# Patient Record
Sex: FEMALE | Race: WHITE | URBAN - NONMETROPOLITAN AREA
[De-identification: names, ages, dates, MRNs, and addresses within clinical notes are randomized per-mention and may not be internally consistent; named-entity substitution may affect disease eponyms.]

---

## 2019-03-06 ENCOUNTER — IMPORTED ENCOUNTER (OUTPATIENT)
Dept: URBAN - NONMETROPOLITAN AREA CLINIC 1 | Facility: CLINIC | Age: 49
End: 2019-03-06

## 2019-03-06 PROBLEM — H52.03: Noted: 2019-03-06

## 2019-03-06 PROBLEM — H52.223: Noted: 2019-03-06

## 2019-03-06 PROCEDURE — S0620 ROUTINE OPHTHALMOLOGICAL EXA: HCPCS

## 2019-03-06 PROCEDURE — 92310 CONTACT LENS FITTING OU: CPT

## 2019-03-06 PROCEDURE — V2520 CONTACT LENS HYDROPHILIC: HCPCS

## 2019-03-06 NOTE — PATIENT DISCUSSION
Hyperopia-Discussed diagnosis with patient. Astigmatism-Discussed diagnosis with patient. Presbyopia-Discussed diagnosis with patient. Updated spec Rx given. Recommend lens that will provide comfort as well as protect safety and health of eyes. CL wear-CLs fit and center well.-Stressed that patient should not sleep in CL. -Updated CL Rx given. -CL care and precautions given. MONO OD DIST OS NEAR

## 2020-03-10 ENCOUNTER — IMPORTED ENCOUNTER (OUTPATIENT)
Dept: URBAN - NONMETROPOLITAN AREA CLINIC 1 | Facility: CLINIC | Age: 50
End: 2020-03-10

## 2020-03-10 PROCEDURE — 92310 CONTACT LENS FITTING OU: CPT

## 2020-03-10 PROCEDURE — S0621 ROUTINE OPHTHALMOLOGICAL EXA: HCPCS

## 2020-03-10 PROCEDURE — V2520 CONTACT LENS HYDROPHILIC: HCPCS

## 2020-10-16 ENCOUNTER — IMPORTED ENCOUNTER (OUTPATIENT)
Dept: URBAN - NONMETROPOLITAN AREA CLINIC 1 | Facility: CLINIC | Age: 50
End: 2020-10-16

## 2022-04-15 ASSESSMENT — VISUAL ACUITY
OD_SC: 20/50+3
OD_SC: 20/20
OS_SC: 20/30+4
OD_SC: 20/40-1
OD_SC: 20/40-1
OS_SC: 20/25-
OS_SC: 20/20-
OS_SC: 20/25-
OS_CC: 20/25-

## 2022-04-15 ASSESSMENT — TONOMETRY
OD_IOP_MMHG: 14
OS_IOP_MMHG: 14
OD_IOP_MMHG: 14
OS_IOP_MMHG: 14